# Patient Record
Sex: FEMALE | Race: WHITE
[De-identification: names, ages, dates, MRNs, and addresses within clinical notes are randomized per-mention and may not be internally consistent; named-entity substitution may affect disease eponyms.]

---

## 2018-03-15 ENCOUNTER — HOSPITAL ENCOUNTER (OUTPATIENT)
Dept: HOSPITAL 68 - STS | Age: 38
End: 2018-03-15
Attending: ORTHOPAEDIC SURGERY
Payer: COMMERCIAL

## 2018-03-15 VITALS — BODY MASS INDEX: 27.28 KG/M2 | HEIGHT: 68 IN | WEIGHT: 180 LBS

## 2018-03-15 DIAGNOSIS — M25.851: Primary | ICD-10-CM

## 2018-03-15 DIAGNOSIS — M25.551: ICD-10-CM

## 2018-03-15 PROCEDURE — C9399 UNCLASSIFIED DRUGS OR BIOLOG: HCPCS

## 2018-03-15 NOTE — OPERATIVE REPORT
Operative/Inv Procedure Report
Surgery Date: 03/15/18
Name of Procedure:
Right hip arthroscopy, labral repair, femoroplasty, acetabuloplasty, anterior 
inferior iliac spine decompression
Pre-Operative Diagnosis:
Right hip labral tear
Post-Operative Diagnosis:
Right hip labral tear
Estimated Blood Loss: scant
Surgeon/Assistant:
Scooter NORIEGA,PRADIP Jacob
Anesthesia: general endotracheal tube
Complications:
None
Condition:
Stable to PACU
Operative Indication:
This is a 37-year-old female with long-standing right hip pain that has failed 
conservative care.  MRI showed a labral tear.  Risks and benefits of the 
procedure were discussed with the patient at length.  Risks include but are not 
limited to nerve damage, muscle damage, infection, blood loss, blood clots, 
pulmonary embolus, and even death.  The patient agreed to the above risks and 
elected to proceed with surgery.
 
Operative/Procedure Note
Note:
The patient was taken to the operating room and placed supine on the operating 
room table.  General anesthesia was induced by the anesthesia team.  The patient
received IV antibiotics prior to incision.  A timeout was performed prior to 
incision.  The site marking was visualized prior to the incision.  The patient 
was positioned on the hip table and the perineum was positioned up against a 
well-padded post.  X-rays were taken to ensure adequate positioning and 
distraction of the extremity.  The hip was prepped and draped in the normal 
sterile fashion.  Traction was then applied.  A spinal needle was used to enter 
the hip joint under x-ray guidance in the lateral portal position just anterior 
to the greater trochanter.  An air arthrogram was established.  The hip joint 
was insufflated with saline.  The spinal needle was removed from the hip joint. 
This allowed the spinal needle to be reinserted through the capsule while 
avoiding the labrum.  A wire was then inserted through the spinal needle.  Over 
the wire a cannula was inserted.  The scope was then inserted and under direct 
visualization an anterolateral portal was then established with a spinal needle.
 This was made just lateral to a sagittal line drawn down from the ASIS.  A 
[place] blade was then inserted and a capsulotomy was performed connecting the 2 
portals.  The capsule was debrided with a shaver.  Any bleeding vessels were 
identified and cauterized.  The diagnostic arthroscopy was then performed which 
showed the above findings.
 
A [place] blade was then inserted and the labrum was further let down from the 
acetabular rim.  The shaver was used to debride the soft tissue deep to the 
labrum and to expose the bony acetabulum.  A switching stick was then inserted 
and the bur was assembled over the switching stick.  The bur was then used to 
perform the acetabuloplasty.  The bony resection started centrally at the 
superior acetabulum and then was tapered medially and laterally.  This was 
performed to create a smooth, normal acetabular transition.  Care was taken to 
protect the labrum during bony resection.  X-rays were taken to ensure adequate 
bony resection.  A bur was then used to decompress the anterior inferior iliac 
spine.  Next the acetabulum was drilled to place an anchor.  The drill guide was
used to drill while the chondral surface was visualized to ensure no violation 
of the chondral surface occurred.  The anchor was then placed.  The arthro-
Bruce was then used to shuttle the suture.  This was then tied down with a 
locking knot and several half hitches.  The excess suture was cut.  This process
was repeated.  A total of three 2.3 mm osteoraptor anchors were placed.  The 
labrum was then probed and noted to be quite stable.  This was then further 
stabilized with an RF device.  The shaver was then used to perform a 
chondroplasty over the loose chondral flap.
 
The traction was then let down and the hip was flexed up to 45 degrees.  The 30 
degree scope was then used.  A more proximal portal was established with a 
spinal needle just proximal to the greater trochanter.  A spinal needle was 
inserted and a wire was then shuttled through the spinal needle.  An 11 blade 
was used to incise a skin.  A dilator was then placed over the wire.  The soft 
tissue off the superior aspect of the femoral head neck junction was then taken 
down.  The bur was then inserted and the femoroplasty was begun proximally.  X-
rays were taken to ensure adequate bony resection.  The camera and the bur were 
then switched and further femoroplasty was begun to ensure a smooth normal 
transition from the femoral head down to the neck.  The synovial herniation pit 
was then decompressed with the bur.  Final x-rays were taken to ensure adequate 
femoroplasty.
 
The hip joint was copiously irrigated.  All instruments were removed.  The 
portal sites were closed with 3-0 nylon suture in a simple interrupted fashion 
and the hip joint was insufflated with 20 mL of 0.25% percent Marcaine.  A dry 
sterile dressing was applied and the patient was transferred to PACU in stable 
condition.
Findings:
Anterior superior labral tear.  Posterior labrum intact.  Unstable chondral flap
at the anterior superior acetabulum.  Femoral head articular cartilage intact.  
Small pincer lesion present.  Prominent anterior inferior iliac spine present.  
Large cam lesion present.  Synovial herniation pit present at the head neck 
junction.

## 2018-03-16 NOTE — RADIOLOGY REPORT
EXAMINATION:
Intraoperative fluoroscopy
 
CLINICAL INFORMATION:
Right hip arthroscopy
 
COMPARISON:
None.
 
TECHNIQUE:
Intraoperative fluoroscopy was provided for use by Dr. Helms.  A total of
14 images were saved to PACS.
 
TOTAL FLUOROSCOPIC TIME:
0.5 minutes
 
FINDINGS\E\IMPRESSION:
Intraoperative fluoroscopy provided for use by Dr. Helms.  Please see
operative note for detailed findings.